# Patient Record
Sex: FEMALE | Race: WHITE | NOT HISPANIC OR LATINO
[De-identification: names, ages, dates, MRNs, and addresses within clinical notes are randomized per-mention and may not be internally consistent; named-entity substitution may affect disease eponyms.]

---

## 2017-12-26 ENCOUNTER — TRANSCRIPTION ENCOUNTER (OUTPATIENT)
Age: 37
End: 2017-12-26

## 2018-02-09 ENCOUNTER — TRANSCRIPTION ENCOUNTER (OUTPATIENT)
Age: 38
End: 2018-02-09

## 2018-02-13 ENCOUNTER — TRANSCRIPTION ENCOUNTER (OUTPATIENT)
Age: 38
End: 2018-02-13

## 2018-12-21 ENCOUNTER — TRANSCRIPTION ENCOUNTER (OUTPATIENT)
Age: 38
End: 2018-12-21

## 2019-11-23 ENCOUNTER — TRANSCRIPTION ENCOUNTER (OUTPATIENT)
Age: 39
End: 2019-11-23

## 2021-06-09 ENCOUNTER — RESULT REVIEW (OUTPATIENT)
Age: 41
End: 2021-06-09

## 2021-07-21 ENCOUNTER — TRANSCRIPTION ENCOUNTER (OUTPATIENT)
Age: 41
End: 2021-07-21

## 2021-08-09 ENCOUNTER — NON-APPOINTMENT (OUTPATIENT)
Age: 41
End: 2021-08-09

## 2021-08-09 ENCOUNTER — APPOINTMENT (OUTPATIENT)
Dept: ORTHOPEDIC SURGERY | Facility: CLINIC | Age: 41
End: 2021-08-09
Payer: COMMERCIAL

## 2021-08-09 DIAGNOSIS — S92.511A DISPLACED FRACTURE OF PROXIMAL PHALANX OF RIGHT LESSER TOE(S), INITIAL ENCOUNTER FOR CLOSED FRACTURE: ICD-10-CM

## 2021-08-09 PROCEDURE — 99204 OFFICE O/P NEW MOD 45 MIN: CPT

## 2021-08-09 PROCEDURE — 73630 X-RAY EXAM OF FOOT: CPT | Mod: RT

## 2021-08-09 NOTE — HISTORY OF PRESENT ILLNESS
[FreeTextEntry1] : SAUNDRA HOWE is a 41 year old female who presents for initial evaluation of right lesser toe pain. She injured her toe after she stubbed about 1 month. Proximal failings fracture in the urgent care. she alanna ton vacation treating conservatively. She wore sneakers to the office today. She is concerned when she put her toe in closed shoe. Pain scale is  3/10 today.

## 2021-08-09 NOTE — ADDENDUM
[FreeTextEntry1] : I, Shelton Daniel, acted solely as a scribe for Dr. Ryder Banegas on this date 08/09/2021  .\par  \par All medical record entries made by the Scribe were at my, Dr. Ryder Banegas, direction and personally dictated by me on 08/09/2021 . I have reviewed the chart and agree that the record accurately reflects my personal performance of the history, physical exam, assessment and plan. I have also personally directed, reviewed, and agreed with the chart.

## 2021-08-09 NOTE — PHYSICAL EXAM
[de-identified] : General: Alert and oriented x3. In no acute distress. Pleasant in nature with a normal affect. No apparent respiratory distress.\par Right  Foot Exam\par Skin: Clean, dry, intact\par Inspection: No obvious malalignment, no masses, no swelling, no effusion\par Pulses: 2+ DP/PT pulses\par ROM: FOOT Full  ROM of digits, ANKLE 10 degrees of dorsiflexion, 40 degrees of plantarflexion, 10 degrees of subtalar motion.\par Painful ROM: None\par Tenderness: No tenderness over the medial malleolus, No tenderness over the lateral malleolus, no CFL/ATFL/PTFL pain, no deltoid ligament pain. No heel pain. No Achilles tenderness. No 5th metatarsal pain. No pain to the LisFranc joint. No ttp over the posterior tibial tendon.\par Stability: Negative anterior/posterior drawer.\par Strength: 5/5 ADD/ABD/TA/GS/EHL/FHL/EDL\par Neuro: Sensation in tact to light touch throughout\par Additional tests: Negative Mortons test, negative tarsal tunnel tinels, negative single heel rise. \par \par  [de-identified] : New right foot x-rays taken and reviewed on 8/9/21:Transverse  Proximal phalanx fracture small toe stable and healing. \par \par \par \par \par \par Procedure was performed at the [facility]\par \par EXAM: FOOT MIN 3 VWS RIGHT\par \par \par PROCEDURE DATE: 07/21/2021\par \par \par INTERPRETATION: EXAMINATION: XR FOOT 3 VIEWS RIGHT\par \par CLINICAL INDICATION:Arthralgia of ankle or foot, right\par \par COMPARISON: None\par \par TECHNIQUE: Right foot, 3 views\par \par INTERPRETATION:\par There is an acute nondisplaced extra-articular fracture obliquely present across the base of the fifth digit proximal phalanx. There is overlying soft tissue edema. The midfoot alignment is maintained. The joint spaces are preserved.\par \par IMPRESSION: Acute fifth digit proximal phalangeal fracture.\par \par --- End of Report ---\par \par \par \par \par \par \par SHLOMIT A GOLDBERG-STEIN MD; Attending Radiologist\par This document has been electronically signed. Jul 21 2021 7:39PM

## 2021-08-09 NOTE — DISCUSSION/SUMMARY
[de-identified] : Today I had a lengthy discussion with the patient regarding their right proximal phalanx fracture  5th digit  pain.I have addressed all the patient's concerns surrounding the pathology of their condition. X- RAY was reviewed today.\par \par I recommend that the patient utilize ice, NSAIDS PRN, and heat. They can also elevate their right foot above the level of the heart. \par \par The patient understood and verbally agreed to the treatment plan. All of their questions were answered and they were satisfied with the visit. The patient should call the office if they have any questions or experience worsening symptoms.

## 2021-10-26 ENCOUNTER — RESULT REVIEW (OUTPATIENT)
Age: 41
End: 2021-10-26

## 2022-06-05 ENCOUNTER — NON-APPOINTMENT (OUTPATIENT)
Age: 42
End: 2022-06-05

## 2022-10-02 ENCOUNTER — NON-APPOINTMENT (OUTPATIENT)
Age: 42
End: 2022-10-02

## 2023-02-20 ENCOUNTER — NON-APPOINTMENT (OUTPATIENT)
Age: 43
End: 2023-02-20

## 2023-06-28 ENCOUNTER — APPOINTMENT (OUTPATIENT)
Dept: INTERNAL MEDICINE | Facility: CLINIC | Age: 43
End: 2023-06-28

## 2024-01-27 ENCOUNTER — NON-APPOINTMENT (OUTPATIENT)
Age: 44
End: 2024-01-27

## 2024-02-18 ENCOUNTER — NON-APPOINTMENT (OUTPATIENT)
Age: 44
End: 2024-02-18

## 2024-05-18 ENCOUNTER — NON-APPOINTMENT (OUTPATIENT)
Age: 44
End: 2024-05-18

## 2025-01-16 ENCOUNTER — EMERGENCY (EMERGENCY)
Facility: HOSPITAL | Age: 45
LOS: 0 days | Discharge: HOME | End: 2025-01-16
Attending: EMERGENCY MEDICINE
Payer: COMMERCIAL

## 2025-01-16 ENCOUNTER — NON-APPOINTMENT (OUTPATIENT)
Age: 45
End: 2025-01-16

## 2025-01-16 ENCOUNTER — TRANSCRIPTION ENCOUNTER (OUTPATIENT)
Age: 45
End: 2025-01-16

## 2025-01-16 VITALS
WEIGHT: 149.91 LBS | SYSTOLIC BLOOD PRESSURE: 119 MMHG | HEART RATE: 90 BPM | RESPIRATION RATE: 16 BRPM | OXYGEN SATURATION: 100 % | DIASTOLIC BLOOD PRESSURE: 97 MMHG | TEMPERATURE: 98 F | HEIGHT: 66 IN

## 2025-01-16 DIAGNOSIS — L50.9 URTICARIA, UNSPECIFIED: ICD-10-CM

## 2025-01-16 DIAGNOSIS — T78.40XA ALLERGY, UNSPECIFIED, INITIAL ENCOUNTER: ICD-10-CM
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
## 2025-01-16 PROCEDURE — 99284 EMERGENCY DEPT VISIT MOD MDM: CPT

## 2025-01-16 PROCEDURE — 99282 EMERGENCY DEPT VISIT SF MDM: CPT

## 2025-01-16 RX ORDER — PREDNISONE 5 MG
1 TABLET ORAL
Qty: 4 | Refills: 0
Start: 2025-01-16 | End: 2025-01-19

## 2025-01-16 RX ORDER — EPINEPHRINE 0.15 MG/.15ML
0.3 INJECTION, SOLUTION INTRAMUSCULAR
Qty: 1 | Refills: 0
Start: 2025-01-16 | End: 2025-01-16

## 2025-01-16 NOTE — ED STATDOCS - NSFOLLOWUPINSTRUCTIONS_ED_ALL_ED_FT
Allergies, Adult  An allergy is a condition that causes the body's defense system (immune system) to react too strongly to an allergen. An allergen is a substance that is harmless to most people but can cause a reaction in some people.    Allergies often affect the nose (allergic rhinitis), eyes (conjunctivitis), skin (atopic dermatitis), and stomach. They can be mild, moderate, or severe. They cannot spread from person to person. Allergies can start at any age. In some cases, they may go away as you get older.    What are the causes?  Allergies are caused by allergens. These may be:  Outdoor allergens. These include pollen, car fumes, and mold.  Indoor allergens. These include dust, smoke, mold, and pet dander.  Other allergens. These include foods, medicines, scents, and insect bites or stings.  What increases the risk?  You are more likely to have allergies if you have:  Family members with allergies.  Family members who have a condition that may be caused by allergens, such as asthma.  What are the signs or symptoms?  Symptoms depend on how severe your allergy is.    Mild to moderate symptoms    Runny nose, stuffy nose (nasal congestion), or sneezing.  Itchy mouth, ears, or throat.  Postnasal drip. This is a feeling of mucus dripping down the back of your throat.  Sore throat.  Itchy, red, watery, or puffy eyes.  Skin rash, or itchy, red, swollen areas of skin (hives).  Stomach cramps or bloating.  Severe symptoms    A bad allergy to food, medicine, or insect bites may cause a severe reaction (anaphylactic reaction). Symptoms include:  A red face.  Coughing or making high-pitched whistling sounds when you breathe, most often when you breathe out (wheezing).  Swollen lips, tongue, or mouth.  A tight or swollen throat.  Chest pain or tightness, or a fast heartbeat.  Trouble breathing or shortness of breath.  Pain in your abdomen.  Vomiting or diarrhea.  Feeling dizzy or fainting.  How is this diagnosed?  Allergies are diagnosed based on your symptoms, your family and medical history, and a physical exam. You may also have tests, such as:  Skin tests. These may be done to see how your skin reacts to allergens. Tests include:  Skin prick test. For this test, the allergen is put in your body through a small prick in the skin.  Intradermal skin test. For this test, a small amount of the allergen is put under the first layer of your skin.  Patch test. For this test, a small amount of the allergen is placed on your skin. The area is covered and then checked after a few days.  Blood tests.  A challenge test. For this test, you eat or breathe in the allergen to see if you have a reaction.  You may also be asked to:  Keep a food diary. This means writing down all the foods, drinks, and symptoms you have in a day.  Try an elimination diet. To do this:  Stop eating certain foods.  Add those foods back one by one to find out if any of them cause a reaction.  How is this treated?  A person putting eye drops in an eye.  A person using nasal spray.  Treatment for allergies depends on your symptoms. It may include:  Cold, wet cloths (cold compresses). These can be used to soothe itching and swelling.  Eye drops or nasal sprays.  A saline solution to clear out your nose and keep it moist (nasal irrigation). A saline solution is made of salt and water.  A humidifier. This can add moisture to the air.  Skin creams. These can treat rashes or itching.  Diet changes to cut out foods that cause allergies.  Being exposed again and again to tiny amounts of allergens. This can help your body build a defense against them (tolerance). This process is called immunotherapy. It may be done using:  Allergy shots. This is when you get a shot of the allergen.  Sublingual immunotherapy. This is when you take a small dose of the allergen under your tongue.  Allergy medicines (antihistamines) or other medicines. These can help block the allergic reaction.  Using an auto-injector pen. An auto-injector pen is a device filled with medicine that gives an emergency shot of epinephrine. Your health care provider will teach you how to use it.  Follow these instructions at home:  Medicines    A person using an auto-injector pen in the thigh.  Take or apply over-the-counter and prescription medicines only as told by your provider.  Always carry your auto-injector pen if you are at risk of an anaphylactic reaction. Give yourself the shot as told by your provider.  Eating and drinking    Follow instructions from your provider about what you may eat and drink.  Drink enough fluid to keep your pee (urine) pale yellow.  General instructions    Wear a medical alert bracelet or necklace if you have had an anaphylactic reaction in the past.  Avoid known allergens when you can.  Keep all follow-up visits. Your provider will watch your symptoms and talk about treatment options with you.  Contact a health care provider if:  Your symptoms do not get better with treatment.  Get help right away if:  You have any symptoms of anaphylactic reaction.  You use an auto-injector pen. You will need more medical care even if the medicine seems to be working. An anaphylactic reaction may happen again within 72 hours (rebound anaphylaxis).  These symptoms may be an emergency. Use the auto-injector pen right away. Then call 911.  Do not wait to see if the symptoms will go away.  Do not drive yourself to the hospital.  This information is not intended to replace advice given to you by your health care provider. Make sure you discuss any questions you have with your health care provider.    Document Revised: 08/30/2023 Document Reviewed: 08/30/2023  [employer] Patient Education © 2024 [employer]  [employer] logo  Terms and Conditions  Privacy Policy  Editorial Policy  All content on this site: Copyright © 2025 [employer]), its licensors, and contributors. All rights are reserved, including those for text and data mining, AI training, and similar technologies. For all open access content, the Creative Commons licensing terms apply.  Cookies are used by this site. To decline or learn more, visit our Cookies page.

## 2025-01-16 NOTE — ED STATDOCS - OBJECTIVE STATEMENT
45 y/o F with no pertinent PMHx presents to the ED c/o allergic reaction. States she gets weekly allergy shots, last shots today. States she did not have a reaction and went home. Pt ate sushi from a restaurant that she has eaten before, but soon after started to get itchy hives all over her body and around her mouth. States it felt like her throat was swelling. Called her Allergist who told her to take 50mg Benadryl. Pt went to  received Dexamethasone. Pt does not have epi pen. Pt currently feeling better.

## 2025-01-16 NOTE — ED ADULT TRIAGE NOTE - CHIEF COMPLAINT QUOTE
Pt presents from  for allergic reaction. Pt states she gets allergy shots once a week, went for the shot today, went home, had sushi and began having hives around mouth, down neck and abdomen. States she also felt some throat swelling. Took benadryl at home, unknown dosage and received Dexamethasone at . No stridor noted, airway clear, able to clear secretions or hives. Known allergies to pets and environmental. No epi given PTA

## 2025-01-16 NOTE — ED STATDOCS - CLINICAL SUMMARY MEDICAL DECISION MAKING FREE TEXT BOX
Vital signs within normal limits.  All symptoms have resolved.  Discharged home in good condition with EpiPen, and prednisone.  Can continue antihistamines as needed.  Recommends outpatient follow-up with allergy.  Strict precautions given worsening.  Patient relies understanding agree plan

## 2025-01-16 NOTE — ED STATDOCS - PATIENT PORTAL LINK FT
You can access the FollowMyHealth Patient Portal offered by [facility] by registering at the following website: [email or web address]. By joining [employer]’s FollowMyHealth portal, you will also be able to view your health information using other applications (apps) compatible with our system.

## 2025-01-16 NOTE — ED STATDOCS - NS_EDPROVIDERDISPOUSERTYPE_ED_A_ED
posterior Scribe Attestation (For Scribes USE Only)... Attending Attestation (For Attendings USE Only).../Scribe Attestation (For Scribes USE Only)...